# Patient Record
Sex: MALE | Race: OTHER | URBAN - METROPOLITAN AREA
[De-identification: names, ages, dates, MRNs, and addresses within clinical notes are randomized per-mention and may not be internally consistent; named-entity substitution may affect disease eponyms.]

---

## 2019-05-30 ENCOUNTER — IMPORTED ENCOUNTER (OUTPATIENT)
Dept: URBAN - METROPOLITAN AREA CLINIC 9 | Facility: CLINIC | Age: 69
End: 2019-05-30

## 2019-07-10 ENCOUNTER — IMPORTED ENCOUNTER (OUTPATIENT)
Dept: URBAN - METROPOLITAN AREA CLINIC 9 | Facility: CLINIC | Age: 69
End: 2019-07-10

## 2019-08-21 ENCOUNTER — IMPORTED ENCOUNTER (OUTPATIENT)
Dept: URBAN - METROPOLITAN AREA CLINIC 9 | Facility: CLINIC | Age: 69
End: 2019-08-21

## 2019-11-25 ENCOUNTER — IMPORTED ENCOUNTER (OUTPATIENT)
Dept: URBAN - METROPOLITAN AREA CLINIC 9 | Facility: CLINIC | Age: 69
End: 2019-11-25

## 2020-02-05 ENCOUNTER — IMPORTED ENCOUNTER (OUTPATIENT)
Dept: URBAN - METROPOLITAN AREA CLINIC 9 | Facility: CLINIC | Age: 70
End: 2020-02-05

## 2020-07-29 ENCOUNTER — IMPORTED ENCOUNTER (OUTPATIENT)
Dept: URBAN - METROPOLITAN AREA CLINIC 9 | Facility: CLINIC | Age: 70
End: 2020-07-29

## 2021-02-11 NOTE — PATIENT DISCUSSION
VISUAL DISTURBANCE: LONGSTANDING. POSSIBLY MIGRAINES. PT TO CONTACT US AND PCP IF HE NOTICES SYMPTOMS WORSEN OR ANY NEW SYMTPOMS FOR FURTHER EVALUATION.

## 2021-03-24 ENCOUNTER — IMPORTED ENCOUNTER (OUTPATIENT)
Dept: URBAN - METROPOLITAN AREA CLINIC 9 | Facility: CLINIC | Age: 71
End: 2021-03-24

## 2021-03-24 ENCOUNTER — PREPPED CHART (OUTPATIENT)
Dept: URBAN - METROPOLITAN AREA CLINIC 15 | Facility: CLINIC | Age: 71
End: 2021-03-24

## 2021-03-24 PROBLEM — H16.223: Noted: 2021-03-24

## 2021-03-24 PROBLEM — Z98.890: Noted: 2021-03-24

## 2021-03-24 PROBLEM — Z96.1: Noted: 2021-03-24

## 2021-10-16 ASSESSMENT — KERATOMETRY
OD_AXISANGLE2_DEGREES: 89
OS_K1POWER_DIOPTERS: 42.75
OS_K2POWER_DIOPTERS: 45
OD_K1POWER_DIOPTERS: 43.25
OS_K1POWER_DIOPTERS: 42.5
OS_AXISANGLE_DEGREES: 172
OS_K2POWER_DIOPTERS: 45
OD_K2POWER_DIOPTERS: 45
OD_K1POWER_DIOPTERS: 43.25
OS_AXISANGLE_DEGREES: 3
OD_AXISANGLE2_DEGREES: 80
OD_AXISANGLE_DEGREES: 179
OD_AXISANGLE_DEGREES: 170
OD_K2POWER_DIOPTERS: 45
OS_AXISANGLE2_DEGREES: 82
OS_AXISANGLE2_DEGREES: 93

## 2021-10-16 ASSESSMENT — VISUAL ACUITY
OD_SC: 20/25 SN
OS_SC: 20/40 -2 SN
OS_SC: 20/25 + SN
OS_SC: 20/20 SN
OS_SC: 20/30 -2 SN
OD_SC: 20/25 + SN
OS_SC: 20/40 SN
OS_SC: 20/30 -2 SN
OD_SC: 20/20 - SN
OD_SC: 20/25 SN
OS_SC: 20/25 + SN
OD_SC: 20/25 SN
OD_SC: 20/20 -2 SN
OS_CC: 20/20 - SN
OD_CC: 20/20 -2 SN
OD_SC: 20/25 + SN

## 2021-10-16 ASSESSMENT — TONOMETRY
OD_IOP_MMHG: 11
OS_IOP_MMHG: 22
OD_IOP_MMHG: 19
OD_IOP_MMHG: 15
OD_IOP_MMHG: 13
OD_IOP_MMHG: 15
OS_IOP_MMHG: 19
OD_IOP_MMHG: 18
OS_IOP_MMHG: 20
OS_IOP_MMHG: 18
OS_IOP_MMHG: 18
OD_IOP_MMHG: 12
OS_IOP_MMHG: 21
OS_IOP_MMHG: 19

## 2021-10-19 ASSESSMENT — TONOMETRY
OS_IOP_MMHG: 19
OD_IOP_MMHG: 19

## 2021-10-19 ASSESSMENT — VISUAL ACUITY
OS_SC: 20/30
OD_SC: 20/25

## 2021-10-20 ENCOUNTER — ESTABLISHED COMPREHENSIVE EXAM (OUTPATIENT)
Dept: URBAN - METROPOLITAN AREA CLINIC 15 | Facility: CLINIC | Age: 71
End: 2021-10-20

## 2021-10-20 DIAGNOSIS — H16.223: ICD-10-CM

## 2021-10-20 DIAGNOSIS — H40.013: ICD-10-CM

## 2021-10-20 DIAGNOSIS — H02.88B: ICD-10-CM

## 2021-10-20 DIAGNOSIS — H02.88A: ICD-10-CM

## 2021-10-20 DIAGNOSIS — Z96.1: ICD-10-CM

## 2021-10-20 PROCEDURE — 99214 OFFICE O/P EST MOD 30 MIN: CPT

## 2021-10-20 ASSESSMENT — TONOMETRY
OS_IOP_MMHG: 15
OD_IOP_MMHG: 15

## 2021-10-20 ASSESSMENT — VISUAL ACUITY
OS_SC: 20/25+2
OD_SC: 20/25-1

## 2022-04-12 NOTE — PATIENT DISCUSSION
CATARACTS, OU: VISUALLY SIGNIFICANT.  OPTION OF SURGERY VERSUS FOLLOWING VERSUS UPDATING GLASSES DISCUSSED.  RBA'S DISCUSSED, PATIENT UNDERSTANDS AND DESIRES SURGERY TO INCREASE VISION FOR READING AND WATCHING TV.  SCHEDULE CATARACT SURGERY/PRE-OP WITH Robbie

## 2022-04-13 ENCOUNTER — ESTABLISHED PATIENT (OUTPATIENT)
Dept: URBAN - METROPOLITAN AREA CLINIC 15 | Facility: CLINIC | Age: 72
End: 2022-04-13

## 2022-04-13 DIAGNOSIS — H02.88B: ICD-10-CM

## 2022-04-13 DIAGNOSIS — Z96.1: ICD-10-CM

## 2022-04-13 DIAGNOSIS — H02.88A: ICD-10-CM

## 2022-04-13 DIAGNOSIS — H16.223: ICD-10-CM

## 2022-04-13 DIAGNOSIS — H40.013: ICD-10-CM

## 2022-04-13 PROCEDURE — 99214 OFFICE O/P EST MOD 30 MIN: CPT

## 2022-04-13 PROCEDURE — 92133 CPTRZD OPH DX IMG PST SGM ON: CPT

## 2022-04-13 ASSESSMENT — VISUAL ACUITY
OS_SC: J3
OS_SC: 20/20
OD_SC: 20/20
OD_SC: J1

## 2022-04-13 ASSESSMENT — TONOMETRY
OD_IOP_MMHG: 14
OS_IOP_MMHG: 14

## 2022-05-05 NOTE — PATIENT DISCUSSION
Risks, benefits, limitations, and alternatives of cataract extraction discussed with patient, including but not limited to: bleeding, infection, acute or chronic intraocular inflammation, retinal hole/tear/detachment, increased or decreased intraocular pressure, macular edema, corneal edema, posterior capsule opacification, ptosis, irregular pupil, no improvement in vision, worsened vision, need for additional surgery, and death. Patient understands the risks and wishes to proceed and desires Standard set for Distance vision OD.  He understands he will still need to wear glasses after surgery for reading and distance (astigmatism).

## 2022-06-01 NOTE — PATIENT DISCUSSION
Risks, benefits, limitations, and alternatives of cataract extraction discussed with patient, including but not limited to: bleeding, infection, acute or chronic intraocular inflammation, retinal hole/tear/detachment, increased or decreased intraocular pressure, macular edema, corneal edema, posterior capsule opacification, ptosis, irregular pupil, no improvement in vision, worsened vision, need for additional surgery, and death. Patient understands the risks and wishes to proceed and desires Standard set for Distance vision OS.  He understands he will still need to wear glasses after surgery for reading and distance (astigmatism).

## 2022-07-03 RX ORDER — LEVOTHYROXINE SODIUM 137 UG/1
TABLET ORAL
COMMUNITY

## 2022-09-15 ENCOUNTER — ESTABLISHED PATIENT (OUTPATIENT)
Dept: URBAN - METROPOLITAN AREA CLINIC 9 | Facility: CLINIC | Age: 72
End: 2022-09-15

## 2022-09-15 DIAGNOSIS — Z96.1: ICD-10-CM

## 2022-09-15 DIAGNOSIS — H02.88A: ICD-10-CM

## 2022-09-15 DIAGNOSIS — H16.223: ICD-10-CM

## 2022-09-15 DIAGNOSIS — H18.11: ICD-10-CM

## 2022-09-15 DIAGNOSIS — H02.88B: ICD-10-CM

## 2022-09-15 DIAGNOSIS — H40.013: ICD-10-CM

## 2022-09-15 PROCEDURE — 99213 OFFICE O/P EST LOW 20 MIN: CPT

## 2022-09-15 RX ORDER — SODIUM CHLORIDE 50 MG/G: OINTMENT OPHTHALMIC EVERY EVENING

## 2022-09-15 ASSESSMENT — TONOMETRY
OD_IOP_MMHG: 18
OS_IOP_MMHG: 17

## 2022-09-15 ASSESSMENT — VISUAL ACUITY
OD_SC: J1
OS_SC: J2
OS_SC: 20/25
OD_SC: 20/30-2

## 2022-12-14 ENCOUNTER — ESTABLISHED PATIENT (OUTPATIENT)
Dept: URBAN - METROPOLITAN AREA CLINIC 15 | Facility: CLINIC | Age: 72
End: 2022-12-14

## 2022-12-14 PROCEDURE — 92014 COMPRE OPH EXAM EST PT 1/>: CPT

## 2022-12-14 ASSESSMENT — VISUAL ACUITY
OD_SC: 20/20-2
OS_SC: 20/20-2
OS_SC: J1-2
OD_SC: J2

## 2022-12-14 ASSESSMENT — TONOMETRY
OD_IOP_MMHG: 18
OS_IOP_MMHG: 16